# Patient Record
Sex: MALE | Race: WHITE | URBAN - METROPOLITAN AREA
[De-identification: names, ages, dates, MRNs, and addresses within clinical notes are randomized per-mention and may not be internally consistent; named-entity substitution may affect disease eponyms.]

---

## 2017-12-18 ENCOUNTER — EMERGENCY (EMERGENCY)
Facility: HOSPITAL | Age: 42
LOS: 1 days | Discharge: HOME | End: 2017-12-18
Attending: EMERGENCY MEDICINE
Payer: COMMERCIAL

## 2017-12-18 VITALS
TEMPERATURE: 99 F | SYSTOLIC BLOOD PRESSURE: 132 MMHG | RESPIRATION RATE: 17 BRPM | HEART RATE: 97 BPM | OXYGEN SATURATION: 99 % | WEIGHT: 184.97 LBS | HEIGHT: 69 IN | DIASTOLIC BLOOD PRESSURE: 97 MMHG

## 2017-12-18 VITALS — DIASTOLIC BLOOD PRESSURE: 93 MMHG | SYSTOLIC BLOOD PRESSURE: 133 MMHG | HEART RATE: 93 BPM

## 2017-12-18 PROCEDURE — 99283 EMERGENCY DEPT VISIT LOW MDM: CPT

## 2017-12-18 RX ORDER — DIPHENHYDRAMINE HCL 50 MG
1 CAPSULE ORAL
Qty: 30 | Refills: 0 | OUTPATIENT
Start: 2017-12-18 | End: 2017-12-22

## 2017-12-18 RX ADMIN — Medication 50 MILLIGRAM(S): at 16:41

## 2017-12-18 NOTE — ED PROVIDER NOTE - OBJECTIVE STATEMENT
43 y/o male, pmhx migraine headache c/o diffuse pruritic rash x2 days. Denies fever/chills, swelling to face/lips/throat, sob, new medications/soaps or foods. No sick contacts, travel outside [place] or [place] exposure.

## 2017-12-18 NOTE — ED PROVIDER NOTE - ATTENDING CONTRIBUTION TO CARE
43 y/o male, pmhx migraine headache c/o diffuse pruritic rash x2 days possible food borne allergy, no heent invovlement, well appearing to d.c home with antihistamine, steroids, d.c home with course of steroids, antihistamine.

## 2017-12-18 NOTE — ED PROVIDER NOTE - MEDICAL DECISION MAKING DETAILS
pt well appearing, vss afebrile, sx suggestive pruritic dermatitis, no signs infection, shingles or scabies. Pt attests to allergies to cat dander, numerous scratches noted to chest and back. Will give prednisone, benadryl and dc home to pt private dermatologist.

## 2020-11-20 ENCOUNTER — TRANSCRIPTION ENCOUNTER (OUTPATIENT)
Age: 45
End: 2020-11-20

## 2021-05-31 ENCOUNTER — TRANSCRIPTION ENCOUNTER (OUTPATIENT)
Age: 46
End: 2021-05-31

## 2025-02-03 NOTE — ED ADULT NURSE NOTE - OBJECTIVE STATEMENT
Immediately report any new sign of illness to our surgeon’s office    Our rooms are relatively small and if more than two visitors are coming with you to your procedure, we may ask that some individuals wait in the waiting room to minimize overcrowding.    Please bring a cashless form of payment if you wish to obtain medications (should they be prescribed) from the hospital outpatient pharmacy following your procedure.    If you start taking any new medications or over the counter supplements between now and your surgery/procedure date, please call us back at this number: [phone]  
C/O ITCHY RASH TO AAL OVER SINCE YESTERDAY, AIRWAY PATENT , NO SOB NOTED. TONGUE NOT SWOLLEN.

## 2025-05-01 ENCOUNTER — NON-APPOINTMENT (OUTPATIENT)
Age: 50
End: 2025-05-01

## 2025-06-03 NOTE — ED ADULT NURSE NOTE - PT NEEDS ASSIST
Left a message for the patient to check if they would like an earlier appointment with [facility]. The patient is currently on the waitlist.   
Patient called back and would like to stay with Dr. Arizmendi.   
no

## 2025-06-15 ENCOUNTER — NON-APPOINTMENT (OUTPATIENT)
Age: 50
End: 2025-06-15